# Patient Record
Sex: MALE | Race: WHITE | NOT HISPANIC OR LATINO | Employment: OTHER | URBAN - METROPOLITAN AREA
[De-identification: names, ages, dates, MRNs, and addresses within clinical notes are randomized per-mention and may not be internally consistent; named-entity substitution may affect disease eponyms.]

---

## 2023-03-27 DIAGNOSIS — E21.3 HYPERPARATHYROIDISM: Primary | ICD-10-CM

## 2023-06-08 ENCOUNTER — HOSPITAL ENCOUNTER (OUTPATIENT)
Facility: HOSPITAL | Age: 53
Discharge: HOME | End: 2023-06-08
Attending: INTERNAL MEDICINE | Admitting: INTERNAL MEDICINE
Payer: MEDICARE

## 2023-06-08 VITALS
OXYGEN SATURATION: 100 % | HEART RATE: 79 BPM | HEIGHT: 65 IN | RESPIRATION RATE: 29 BRPM | TEMPERATURE: 98 F | SYSTOLIC BLOOD PRESSURE: 153 MMHG | WEIGHT: 125.69 LBS | DIASTOLIC BLOOD PRESSURE: 81 MMHG | BODY MASS INDEX: 20.94 KG/M2

## 2023-06-08 DIAGNOSIS — I42.8 NON-ISCHEMIC CARDIOMYOPATHY: ICD-10-CM

## 2023-06-08 PROCEDURE — C1751 CATH, INF, PER/CENT/MIDLINE: HCPCS | Performed by: INTERNAL MEDICINE

## 2023-06-08 PROCEDURE — 93460 R&L HRT ART/VENTRICLE ANGIO: CPT | Performed by: INTERNAL MEDICINE

## 2023-06-08 PROCEDURE — 25000003 PHARM REV CODE 250: Performed by: INTERNAL MEDICINE

## 2023-06-08 PROCEDURE — 99152 MOD SED SAME PHYS/QHP 5/>YRS: CPT | Performed by: INTERNAL MEDICINE

## 2023-06-08 PROCEDURE — 99153 MOD SED SAME PHYS/QHP EA: CPT | Performed by: INTERNAL MEDICINE

## 2023-06-08 PROCEDURE — 63600175 PHARM REV CODE 636 W HCPCS: Performed by: INTERNAL MEDICINE

## 2023-06-08 PROCEDURE — C1769 GUIDE WIRE: HCPCS | Performed by: INTERNAL MEDICINE

## 2023-06-08 PROCEDURE — C1894 INTRO/SHEATH, NON-LASER: HCPCS | Performed by: INTERNAL MEDICINE

## 2023-06-08 PROCEDURE — 27201423 OPTIME MED/SURG SUP & DEVICES STERILE SUPPLY: Performed by: INTERNAL MEDICINE

## 2023-06-08 PROCEDURE — 25500020 PHARM REV CODE 255: Performed by: INTERNAL MEDICINE

## 2023-06-08 RX ORDER — LIDOCAINE HYDROCHLORIDE 10 MG/ML
INJECTION, SOLUTION EPIDURAL; INFILTRATION; INTRACAUDAL; PERINEURAL
Status: DISCONTINUED | OUTPATIENT
Start: 2023-06-08 | End: 2023-06-08 | Stop reason: HOSPADM

## 2023-06-08 RX ORDER — SODIUM CHLORIDE 9 MG/ML
INJECTION, SOLUTION INTRAVENOUS ONCE
Status: DISCONTINUED | OUTPATIENT
Start: 2023-06-08 | End: 2023-06-08 | Stop reason: HOSPADM

## 2023-06-08 RX ORDER — DIAZEPAM 5 MG/1
10 TABLET ORAL
Status: DISCONTINUED | OUTPATIENT
Start: 2023-06-08 | End: 2023-06-08 | Stop reason: HOSPADM

## 2023-06-08 RX ORDER — MIDAZOLAM HYDROCHLORIDE 1 MG/ML
INJECTION INTRAMUSCULAR; INTRAVENOUS
Status: DISCONTINUED | OUTPATIENT
Start: 2023-06-08 | End: 2023-06-08 | Stop reason: HOSPADM

## 2023-06-08 RX ORDER — MORPHINE SULFATE 4 MG/ML
2 INJECTION, SOLUTION INTRAMUSCULAR; INTRAVENOUS EVERY 4 HOURS PRN
Status: DISCONTINUED | OUTPATIENT
Start: 2023-06-08 | End: 2023-06-08 | Stop reason: HOSPADM

## 2023-06-08 RX ORDER — FAMOTIDINE 40 MG/1
40 TABLET, FILM COATED ORAL DAILY
COMMUNITY

## 2023-06-08 RX ORDER — AMLODIPINE BESYLATE 10 MG/1
10 TABLET ORAL DAILY
COMMUNITY

## 2023-06-08 RX ORDER — SEVELAMER CARBONATE 800 MG/1
800 TABLET, FILM COATED ORAL
COMMUNITY

## 2023-06-08 RX ORDER — PANTOPRAZOLE SODIUM 20 MG/1
20 TABLET, DELAYED RELEASE ORAL DAILY
COMMUNITY

## 2023-06-08 RX ORDER — HYDRALAZINE HYDROCHLORIDE 20 MG/ML
INJECTION INTRAMUSCULAR; INTRAVENOUS
Status: DISCONTINUED | OUTPATIENT
Start: 2023-06-08 | End: 2023-06-08 | Stop reason: HOSPADM

## 2023-06-08 RX ORDER — FENTANYL CITRATE 50 UG/ML
INJECTION, SOLUTION INTRAMUSCULAR; INTRAVENOUS
Status: DISCONTINUED | OUTPATIENT
Start: 2023-06-08 | End: 2023-06-08 | Stop reason: HOSPADM

## 2023-06-08 RX ORDER — HYDROCODONE BITARTRATE AND ACETAMINOPHEN 5; 325 MG/1; MG/1
1 TABLET ORAL EVERY 4 HOURS PRN
Status: DISCONTINUED | OUTPATIENT
Start: 2023-06-08 | End: 2023-06-08 | Stop reason: HOSPADM

## 2023-06-08 RX ORDER — ACETAMINOPHEN 325 MG/1
650 TABLET ORAL EVERY 4 HOURS PRN
Status: DISCONTINUED | OUTPATIENT
Start: 2023-06-08 | End: 2023-06-08 | Stop reason: HOSPADM

## 2023-06-08 RX ORDER — ASPIRIN 81 MG/1
81 TABLET ORAL DAILY
COMMUNITY

## 2023-06-08 RX ORDER — SODIUM CHLORIDE 9 MG/ML
INJECTION, SOLUTION INTRAVENOUS CONTINUOUS
Status: DISCONTINUED | OUTPATIENT
Start: 2023-06-08 | End: 2023-06-08 | Stop reason: HOSPADM

## 2023-06-08 RX ORDER — HYDRALAZINE HYDROCHLORIDE 20 MG/ML
10 INJECTION INTRAMUSCULAR; INTRAVENOUS EVERY 4 HOURS PRN
Status: DISCONTINUED | OUTPATIENT
Start: 2023-06-08 | End: 2023-06-08 | Stop reason: HOSPADM

## 2023-06-08 RX ORDER — VITAMIN B COMPLEX-VITAMIN C-FOLIC ACID 0.8 MG TABLET
1 TABLET DAILY
COMMUNITY

## 2023-06-08 RX ORDER — ONDANSETRON 4 MG/1
8 TABLET, ORALLY DISINTEGRATING ORAL EVERY 8 HOURS PRN
Status: DISCONTINUED | OUTPATIENT
Start: 2023-06-08 | End: 2023-06-08 | Stop reason: HOSPADM

## 2023-06-08 RX ORDER — HYDRALAZINE HYDROCHLORIDE 25 MG/1
25 TABLET, FILM COATED ORAL 2 TIMES DAILY
COMMUNITY
End: 2023-11-02 | Stop reason: SDUPTHER

## 2023-06-08 RX ORDER — DIPHENHYDRAMINE HCL 25 MG
50 CAPSULE ORAL
Status: DISCONTINUED | OUTPATIENT
Start: 2023-06-08 | End: 2023-06-08 | Stop reason: HOSPADM

## 2023-06-08 RX ORDER — CARVEDILOL 25 MG/1
25 TABLET ORAL 2 TIMES DAILY WITH MEALS
COMMUNITY

## 2023-06-08 RX ORDER — SODIUM CHLORIDE 0.9 % (FLUSH) 0.9 %
10 SYRINGE (ML) INJECTION
Status: DISCONTINUED | OUTPATIENT
Start: 2023-06-08 | End: 2023-06-08 | Stop reason: HOSPADM

## 2023-06-08 RX ADMIN — DIAZEPAM 10 MG: 5 TABLET ORAL at 11:06

## 2023-06-08 RX ADMIN — DIPHENHYDRAMINE HYDROCHLORIDE 50 MG: 25 CAPSULE ORAL at 11:06

## 2023-06-08 NOTE — Clinical Note
The site was marked. The groin was prepped. The site was prepped with ChloraPrep. The site was clipped. The patient was draped.

## 2023-06-08 NOTE — Clinical Note
The PA catheter was inserted into the and was removed from the pulmonary wedge. Hemodynamics were performed. Catheter repositioned into PA, RV, RA.

## 2023-06-08 NOTE — Clinical Note
The PA catheter was inserted into the and was removed from the right atrium. Hemodynamics were performed.

## 2023-06-08 NOTE — Clinical Note
The catheter was inserted into the ostium   right coronary artery. An angiography was performed of the right coronary arteries. Multiple views were taken. The angiography was performed via power injection.   4

## 2023-06-08 NOTE — DISCHARGE INSTRUCTIONS
-Remove dressing in 24hrs  -No driving for two Days  -Do not lift anything heavier than a gallon of milk for 5 days  -No tub bathing for 5 days (if you have a groin site).   -No lotions, powders, creams around site for 5 days  - Return to the nearest emergency room if you start running a fever; have any kind of discharge coming from the site, the site looks red or swollen.  - If site starts to bleed, lay flat on the ground, apply pressure to the site and call 911.

## 2023-06-08 NOTE — Clinical Note
The catheter was inserted into the ostium   left main. An angiography was performed of the left coronary arteries. Multiple views were taken. The angiography was performed via power injection.  JL 4

## 2023-06-08 NOTE — Clinical Note
The defib pads were placed on the patient's chest. [Follow-up Visit ___] : a follow-up visit  for [unfilled]

## 2023-06-27 NOTE — DISCHARGE SUMMARY
[facility] - Cath Lab Services  Discharge Note  Short Stay    Procedure(s) (LRB):  CATHETERIZATION, HEART, BOTH LEFT AND RIGHT (N/A)      OUTCOME: Patient tolerated treatment/procedure well without complication and is now ready for discharge.    DISPOSITION: Home or Self Care    FINAL DIAGNOSIS:  AS, CAD    FOLLOWUP: In clinic    DISCHARGE INSTRUCTIONS: No squatting, or heavy lifting for 1 week      Clinical Reference Documents Added to Patient Instructions         Document    CARDIAC CATHETERIZATION (ENGLISH)            TIME SPENT ON DISCHARGE: 31 minutes

## 2023-10-03 ENCOUNTER — TELEPHONE (OUTPATIENT)
Dept: TRANSPLANT | Facility: CLINIC | Age: 53
End: 2023-10-03
Payer: MEDICARE

## 2023-10-03 DIAGNOSIS — I50.9 CONGESTIVE HEART FAILURE, UNSPECIFIED HF CHRONICITY, UNSPECIFIED HEART FAILURE TYPE: Primary | ICD-10-CM

## 2023-11-02 ENCOUNTER — OFFICE VISIT (OUTPATIENT)
Dept: TRANSPLANT | Facility: CLINIC | Age: 53
End: 2023-11-02
Payer: MEDICARE

## 2023-11-02 ENCOUNTER — LAB VISIT (OUTPATIENT)
Dept: LAB | Facility: HOSPITAL | Age: 53
End: 2023-11-02
Attending: INTERNAL MEDICINE
Payer: MEDICARE

## 2023-11-02 VITALS
BODY MASS INDEX: 21.01 KG/M2 | HEIGHT: 65 IN | SYSTOLIC BLOOD PRESSURE: 166 MMHG | WEIGHT: 126.13 LBS | DIASTOLIC BLOOD PRESSURE: 88 MMHG | HEART RATE: 82 BPM

## 2023-11-02 DIAGNOSIS — I50.43 ACUTE ON CHRONIC COMBINED SYSTOLIC AND DIASTOLIC HEART FAILURE: ICD-10-CM

## 2023-11-02 DIAGNOSIS — I34.0 MITRAL VALVE INSUFFICIENCY, UNSPECIFIED ETIOLOGY: ICD-10-CM

## 2023-11-02 DIAGNOSIS — I42.8 NON-ISCHEMIC CARDIOMYOPATHY: ICD-10-CM

## 2023-11-02 DIAGNOSIS — Z74.09 IMPAIRED MOBILITY: ICD-10-CM

## 2023-11-02 DIAGNOSIS — I50.9 CONGESTIVE HEART FAILURE, UNSPECIFIED HF CHRONICITY, UNSPECIFIED HEART FAILURE TYPE: ICD-10-CM

## 2023-11-02 DIAGNOSIS — N25.81 SECONDARY HYPERPARATHYROIDISM: ICD-10-CM

## 2023-11-02 DIAGNOSIS — N18.6 ESRD (END STAGE RENAL DISEASE): ICD-10-CM

## 2023-11-02 DIAGNOSIS — I50.9 CONGESTIVE HEART FAILURE, UNSPECIFIED HF CHRONICITY, UNSPECIFIED HEART FAILURE TYPE: Primary | ICD-10-CM

## 2023-11-02 DIAGNOSIS — Z99.2 HEMODIALYSIS STATUS: ICD-10-CM

## 2023-11-02 DIAGNOSIS — I15.8 OTHER SECONDARY HYPERTENSION: ICD-10-CM

## 2023-11-02 DIAGNOSIS — I07.1 TRICUSPID VALVE INSUFFICIENCY, UNSPECIFIED ETIOLOGY: ICD-10-CM

## 2023-11-02 PROBLEM — I10 HYPERTENSION: Status: ACTIVE | Noted: 2023-11-02

## 2023-11-02 LAB
ALBUMIN SERPL BCP-MCNC: 4.3 G/DL (ref 3.5–5.2)
ALP SERPL-CCNC: 144 U/L (ref 55–135)
ALT SERPL W/O P-5'-P-CCNC: 11 U/L (ref 10–44)
ANION GAP SERPL CALC-SCNC: 14 MMOL/L (ref 8–16)
AST SERPL-CCNC: 22 U/L (ref 10–40)
BILIRUB SERPL-MCNC: 0.6 MG/DL (ref 0.1–1)
BNP SERPL-MCNC: >4900 PG/ML (ref 0–99)
BUN SERPL-MCNC: 15 MG/DL (ref 6–20)
CALCIUM SERPL-MCNC: 9 MG/DL (ref 8.7–10.5)
CHLORIDE SERPL-SCNC: 100 MMOL/L (ref 95–110)
CO2 SERPL-SCNC: 21 MMOL/L (ref 23–29)
CREAT SERPL-MCNC: 4.4 MG/DL (ref 0.5–1.4)
EST. GFR  (NO RACE VARIABLE): 15.3 ML/MIN/1.73 M^2
GLUCOSE SERPL-MCNC: 89 MG/DL (ref 70–110)
MAGNESIUM SERPL-MCNC: 2 MG/DL (ref 1.6–2.6)
POTASSIUM SERPL-SCNC: 3.8 MMOL/L (ref 3.5–5.1)
PROT SERPL-MCNC: 8.4 G/DL (ref 6–8.4)
SODIUM SERPL-SCNC: 135 MMOL/L (ref 136–145)
T4 FREE SERPL-MCNC: 1.03 NG/DL (ref 0.71–1.51)
TSH SERPL DL<=0.005 MIU/L-ACNC: 4.23 UIU/ML (ref 0.4–4)

## 2023-11-02 PROCEDURE — 99204 PR OFFICE/OUTPT VISIT, NEW, LEVL IV, 45-59 MIN: ICD-10-PCS | Mod: S$GLB,,, | Performed by: INTERNAL MEDICINE

## 2023-11-02 PROCEDURE — 3077F PR MOST RECENT SYSTOLIC BLOOD PRESSURE >= 140 MM HG: ICD-10-PCS | Mod: CPTII,S$GLB,, | Performed by: INTERNAL MEDICINE

## 2023-11-02 PROCEDURE — 80053 COMPREHEN METABOLIC PANEL: CPT | Performed by: INTERNAL MEDICINE

## 2023-11-02 PROCEDURE — 99999 PR PBB SHADOW E&M-EST. PATIENT-LVL III: ICD-10-PCS | Mod: PBBFAC,,, | Performed by: INTERNAL MEDICINE

## 2023-11-02 PROCEDURE — 99999 PR PBB SHADOW E&M-EST. PATIENT-LVL III: CPT | Mod: PBBFAC,,, | Performed by: INTERNAL MEDICINE

## 2023-11-02 PROCEDURE — 84439 ASSAY OF FREE THYROXINE: CPT | Performed by: INTERNAL MEDICINE

## 2023-11-02 PROCEDURE — 83880 ASSAY OF NATRIURETIC PEPTIDE: CPT | Performed by: INTERNAL MEDICINE

## 2023-11-02 PROCEDURE — 83880 ASSAY OF NATRIURETIC PEPTIDE: CPT | Mod: 91 | Performed by: INTERNAL MEDICINE

## 2023-11-02 PROCEDURE — 1159F PR MEDICATION LIST DOCUMENTED IN MEDICAL RECORD: ICD-10-PCS | Mod: CPTII,S$GLB,, | Performed by: INTERNAL MEDICINE

## 2023-11-02 PROCEDURE — 84443 ASSAY THYROID STIM HORMONE: CPT | Performed by: INTERNAL MEDICINE

## 2023-11-02 PROCEDURE — 3008F PR BODY MASS INDEX (BMI) DOCUMENTED: ICD-10-PCS | Mod: CPTII,S$GLB,, | Performed by: INTERNAL MEDICINE

## 2023-11-02 PROCEDURE — 3079F PR MOST RECENT DIASTOLIC BLOOD PRESSURE 80-89 MM HG: ICD-10-PCS | Mod: CPTII,S$GLB,, | Performed by: INTERNAL MEDICINE

## 2023-11-02 PROCEDURE — 1159F MED LIST DOCD IN RCRD: CPT | Mod: CPTII,S$GLB,, | Performed by: INTERNAL MEDICINE

## 2023-11-02 PROCEDURE — 83735 ASSAY OF MAGNESIUM: CPT | Performed by: INTERNAL MEDICINE

## 2023-11-02 PROCEDURE — 3079F DIAST BP 80-89 MM HG: CPT | Mod: CPTII,S$GLB,, | Performed by: INTERNAL MEDICINE

## 2023-11-02 PROCEDURE — 3077F SYST BP >= 140 MM HG: CPT | Mod: CPTII,S$GLB,, | Performed by: INTERNAL MEDICINE

## 2023-11-02 PROCEDURE — 99204 OFFICE O/P NEW MOD 45 MIN: CPT | Mod: S$GLB,,, | Performed by: INTERNAL MEDICINE

## 2023-11-02 PROCEDURE — 3008F BODY MASS INDEX DOCD: CPT | Mod: CPTII,S$GLB,, | Performed by: INTERNAL MEDICINE

## 2023-11-02 RX ORDER — FOLIC ACID/VIT B COMPLEX AND C 0.8 MG
1 TABLET ORAL DAILY
COMMUNITY
Start: 2023-10-12

## 2023-11-02 RX ORDER — ISOSORBIDE DINITRATE 20 MG/1
20 TABLET ORAL 3 TIMES DAILY
Qty: 90 TABLET | Refills: 11 | Status: SHIPPED | OUTPATIENT
Start: 2023-11-02 | End: 2024-11-01

## 2023-11-02 RX ORDER — HYDRALAZINE HYDROCHLORIDE 25 MG/1
50 TABLET, FILM COATED ORAL 3 TIMES DAILY
Qty: 90 TABLET | Refills: 5 | Status: SHIPPED | OUTPATIENT
Start: 2023-11-02 | End: 2024-03-01 | Stop reason: SDUPTHER

## 2023-11-02 NOTE — PATIENT INSTRUCTIONS
You have too much extra fluid on you.  Please adhere to a low sodium diet (no more than 1.5 grams of sodium in 24h).  3.   Follow fluid restriction of  2. no more than 1.5 liters in 24 hours..   4. Please have your dialysis team to aggressively remove the extra fluid you have.  5. Increase HYDRALAZINE from 25 mg to 50 mg three times a day (every 8 hours).  6. Start isosorbide dinitrate 20 mg three time a day.  7. F/u in 2 months with echo and labs,

## 2023-11-02 NOTE — PROGRESS NOTES
Advanced Heart Failure [facility] Transplantation Clinic Follow up.        Attending Physician: Uir Stone MD.  The patient's last visit with me was on Visit date not found.         HPI.  This is a 54 yo man with a history of HTN, ESRD on HD, CVA, cardiomyopathy/HF, LVEF 35%, suspected non ischemic, based on result of outside hospital cardiac PET stress that was negative for perfusion defects or ischemia.  His LVEF has reportedly decreased from 45% to 35%. Echo also reported severe TR and moderate MR. He also underwent RHC and LHC at [facility] last June 2023. Non obstructive CAD.    PMH  -ESRD since he was in his 20s. He was told his kidneys never developed completely.  -HTN  -new diagnosis of HF this year. EF 45% then down to 35%.  -severe secondary hyperparathyroidism with significant bones deformity. Use a wheelchair to get around.    SH  He has a wife and a son. Referred from [facility] in [place],     Review of Systems   Constitutional:  Positive for activity change, appetite change and fatigue. Negative for chills, diaphoresis and fever.   HENT:  Negative for nasal congestion, rhinorrhea and sore throat.    Eyes:  Negative for visual disturbance.   Respiratory:  Positive for shortness of breath. Negative for chest tightness and stridor.    Cardiovascular:  Positive for leg swelling. Negative for chest pain and palpitations.   Gastrointestinal:  Positive for abdominal distention. Negative for abdominal pain, diarrhea, nausea and vomiting.   Genitourinary:  Negative for difficulty urinating, dysuria and hematuria.   Integumentary:  Negative for rash.   Neurological:  Negative for seizures, syncope and light-headedness.   Psychiatric/Behavioral:  Negative for agitation and hallucinations.         Past Medical History:   Diagnosis Date    CVD (cardiovascular disease)     ESRD (end stage renal disease)     Hypertension     Venous  "insufficiency         Past Surgical History:   Procedure Laterality Date    CATHETERIZATION OF BOTH LEFT AND RIGHT HEART N/A 6/8/2023    Procedure: CATHETERIZATION, HEART, BOTH LEFT AND RIGHT;  Surgeon: Yaakov Michelle MD;  Location: [facility];  Service: Cardiology;  Laterality: N/A;  RLHC +/- INT. VIA GROIN ACCESS *8am arrival*    DIALYSIS FISTULA CREATION      ELBOW SURGERY      KNEE SURGERY          History reviewed. No pertinent family history.     Review of patient's allergies indicates:   Allergen Reactions    Doxercalciferol Itching        Current Outpatient Medications   Medication Instructions    amLODIPine (NORVASC) 10 mg, Oral, Daily    aspirin (ECOTRIN) 81 mg, Oral, Daily    B complex-vitamin C-folic acid (RENAL VITAMIN) 0.8 mg Tab 1 tablet, Oral, Daily    carvediloL (COREG) 25 mg, Oral, 2 times daily with meals    famotidine (PEPCID) 40 mg, Oral, Daily    hydrALAZINE (APRESOLINE) 25 mg, Oral, 2 times daily    pantoprazole (PROTONIX) 20 mg, Oral, Daily    sevelamer carbonate (RENVELA) 800 mg, Oral, 4 times daily with meals & nightly        There were no vitals filed for this visit.     Wt Readings from Last 3 Encounters:   06/08/23 57 kg (125 lb 10.6 oz)   01/15/23 56.6 kg (124 lb 12.5 oz)     Temp Readings from Last 3 Encounters:   06/08/23 97.7 °F (36.5 °C) (Oral)   01/15/23 99.5 °F (37.5 °C)     BP Readings from Last 3 Encounters:   06/08/23 (!) 153/81   01/15/23 (!) 199/107     Pulse Readings from Last 3 Encounters:   06/08/23 79   01/15/23 82        There is no height or weight on file to calculate BMI. Estimated body surface area is 1.62 meters squared as calculated from the following:    Height as of 6/8/23: 5' 5" (1.651 m).    Weight as of 6/8/23: 57 kg (125 lb 10.6 oz).     Physical Exam  Constitutional:       Appearance: He is well-developed.   HENT:      Head: Normocephalic and atraumatic.      Right Ear: External ear normal.      Left Ear: External ear normal.   Eyes:      " Conjunctiva/sclera: Conjunctivae normal.      Pupils: Pupils are equal, round, and reactive to light.   Neck:      Vascular: Hepatojugular reflux and JVD present.      Comments: JVP 18 cmH20  Cardiovascular:      Rate and Rhythm: Normal rate and regular rhythm.      Pulses: Intact distal pulses.      Heart sounds: S1 normal and S2 normal. No murmur heard.     No friction rub. No gallop.   Pulmonary:      Effort: Pulmonary effort is normal.      Breath sounds: Examination of the right-lower field reveals rales. Examination of the left-lower field reveals rales. Rales present.   Abdominal:      General: Bowel sounds are normal. There is no distension.      Palpations: Abdomen is soft.      Tenderness: There is no abdominal tenderness. There is no guarding or rebound.   Musculoskeletal:      Cervical back: Normal range of motion and neck supple.      Right lower leg: 3+ Edema present.      Left lower leg: 3+ Edema present.   Neurological:      Mental Status: He is alert and oriented to person, place, and time.          Lab Results   Component Value Date     (L) 01/20/2023    K 4.0 01/20/2023     01/20/2023    CO2 19 (L) 01/20/2023    BUN 24 01/20/2023    CREATININE 5.39 (H) 01/20/2023    ALBUMIN 3.2 (L) 01/20/2023       Results for orders placed during the hospital encounter of 06/08/23    Cardiac catheterization    Conclusion    The left ventricular end diastolic pressure was normal.    The estimated blood loss was <50 mL.    There was non-obstructive coronary artery disease..    Pulmonary hypertension was moderate.    Prior to arrival consent was signed and witnessed appropriately.  For to catheterization lab in a fasting state placed on table and prepped and draped in usual sterile fashion.  Time-out was completed.  Right groin was anesthetized with 1% lidocaine.  Via the Seldinger technique and ultrasound guidance a 5 Somali sheath was placed over wire into the right common femoral artery and a 7 Somali  sheath into the right common femoral vein.  Using the vein access point a right heart catheterization was performed using [place]-Roly catheter.  And JL4 and a 0.025 wire then used to traverse the catheter up into the right PA.  Exchanged for a 0.018 wire and then a [place]eld[place]-Roly catheter was placed over this.  Hemodynamics were measured.  No complications were noted.  Removed successfully.  Next,.  JL4 was engaged into the left main.  Images were taken in multiple views.  Then exchanged for a JR4, which was then engaged into the RCA.  Images were taken in multiple views.  JR4 was then engaged for a pigtail catheter was then cross into the left ventricular cavity.  Hemodynamics were measured.  A gradient was measured on pullback.  All wires and catheters removed.   He was transported to the postop area in a stable condition    Findings:  Left main:  Large caliber left main bifurcates into LAD and circumflex.  No significant disease noted.  LAD:  Moderate large caliber LAD wraps the apex in type 1 fashion.  Large diagonal branches noted.  No significant disease noted.  Circumflex:  Small caliber vessel with no significant disease.  Tortuosity noted.  OM1 is noted with significant tortuosity distally.  true circumflex is trivial in caliber.  RCA:  Large caliber superdominant vessel which arborizes out.  Luminal irregularities noted.  Ramus intermedius:  Small caliber vessel covering the lateral wall.  No disease noted.    LVEDP:  Within normal limits  Aortic valve:  No significant stenosis  Cardiac output-7.2 liters/minute, cardiac index 4.44  PA sat:  70%  PVR 4.29.  Moderate-Severe pulmonary hypertension with pulmonary artery pressure 65/24.  Pulmonary capillary wedge pressure mean: 10         Assessment and Plan:  Congestive heart failure, unspecified HF chronicity, unspecified heart failure type  -     Echo; Future; Expected date: 01/02/2024  -     CBC Auto Differential; Future; Expected date: 12/02/2023  -      Comprehensive Metabolic Panel; Future; Expected date: 12/02/2023  -     NT-Pro Natriuretic Peptide; Future; Expected date: 12/02/2023  -     Echo; Future; Expected date: 12/02/2023    Non-ischemic cardiomyopathy    Acute on chronic combined systolic and diastolic heart failure    Mitral valve insufficiency, unspecified etiology    Tricuspid valve insufficiency, unspecified etiology    ESRD (end stage renal disease)    Hemodialysis status    Secondary hyperparathyroidism    Other secondary hypertension    Impaired mobility    Other orders  -     hydrALAZINE (APRESOLINE) 25 MG tablet; Take 2 tablets (50 mg total) by mouth 3 (three) times daily.  Dispense: 90 tablet; Refill: 5  -     isosorbide dinitrate (ISORDIL) 20 MG tablet; Take 1 tablet (20 mg total) by mouth 3 (three) times daily.  Dispense: 90 tablet; Refill: 11         Acute on chronic systolic HF, NYHA class III, stage C.  Etiology: NICM, negative LHC and PET stress test.  Devices: none.  Medications: carvedilol 25 mg BID, hydralazine 25 mg TID.  Hemodynamic status: warm, hypertensive, severely hypervolemic (see physical exam).  Plan:  -Patient does not make urine and is dependent on HD for management of fluid status. He needs aggressive decongestion by his HD team. This recommendation was conveyed to the patient. He understood the importance.   -Increase hydralazine from 25 mg to 50 mg three times a day.  -Start isosorbide dinitrate 20 mg three time a day.    F/u in 2 months with echo and labs.    2. Severe TR and MR??  May be functional. We will obtain our own in house echo for assessment, hopefully after thorough decongestion by his HD team.    3. ESRD on HD for > 20 years.    4.  Bone deformities and decreased mobility. Sequelae of ESRD and secondary hyperparathyroidism.Had parathyroid surgery recently at [facility]SH.

## 2023-11-03 LAB — NT-PROBNP SERPL IA-MCNC: ABNORMAL PG/ML

## 2024-03-01 RX ORDER — HYDRALAZINE HYDROCHLORIDE 25 MG/1
50 TABLET, FILM COATED ORAL 3 TIMES DAILY
Qty: 90 TABLET | Refills: 5 | Status: SHIPPED | OUTPATIENT
Start: 2024-03-01

## 2024-03-21 ENCOUNTER — CLINICAL SUPPORT (OUTPATIENT)
Dept: AUDIOLOGY | Facility: HOSPITAL | Age: 54
End: 2024-03-21
Payer: MEDICARE

## 2024-03-21 DIAGNOSIS — H90.3 SENSORINEURAL HEARING LOSS, BILATERAL: Primary | ICD-10-CM

## 2024-03-21 PROCEDURE — 92567 TYMPANOMETRY: CPT | Performed by: AUDIOLOGIST

## 2024-03-21 PROCEDURE — 92557 COMPREHENSIVE HEARING TEST: CPT | Performed by: AUDIOLOGIST

## 2024-03-21 NOTE — PROGRESS NOTES
Hearing Evaluation        Patient History: Mr. Boyce reports a gradual decrease in hearing, onset years ago.  He also reports constant bilateral tinnitus. Vertigo and middle ear issues are denied. All additional history is unremarkable.        Test Results:                    Pure Tone Testing:      Right ear:       Mild to profound sensorineural hearing loss from 2k-8kHz. Speech reception threshold is in agreement with puretone findings.  Discrimination score of 68% is considered fair.        Left ear:          Mild to profound sensorineural hearing loss from 2k-8kHz. Speech reception threshold is in agreement with puretone findings.  Discrimination score of 70% is considered fair.                                                                            Tympanometry:                                           Right ear:       Type 'A' tymp, normal middle ear pressure/function     Left ear:          Type 'A' tymp, normal middle ear pressure/function                                           Interpretations:      Behavioral test findings indicate a mild to profound precipitously sloping hearing loss from 2k-8kHz, bilaterally. Speech reception thresholds obtained at 30dB, AD and 20dB, AS, and are in agreement with puretone findings. Speech discrimination scores of 68%, AD and 70%, AS, are considered fair.  Immittance measures indicate normal middle ear pressure/function, bilaterally.            Recommendations:   1. Annual hearing evaluations  2. Binaural amplification (Information on [place] given)

## 2024-04-16 ENCOUNTER — HOSPITAL ENCOUNTER (OUTPATIENT)
Facility: HOSPITAL | Age: 54
Discharge: HOME | End: 2024-04-16
Attending: INTERNAL MEDICINE | Admitting: INTERNAL MEDICINE
Payer: MEDICARE

## 2024-04-16 ENCOUNTER — ANESTHESIA (OUTPATIENT)
Dept: ENDOSCOPY | Facility: HOSPITAL | Age: 54
End: 2024-04-16
Payer: MEDICARE

## 2024-04-16 ENCOUNTER — ANESTHESIA EVENT (OUTPATIENT)
Dept: ENDOSCOPY | Facility: HOSPITAL | Age: 54
End: 2024-04-16
Payer: MEDICARE

## 2024-04-16 DIAGNOSIS — R14.2 BELCHING: ICD-10-CM

## 2024-04-16 DIAGNOSIS — R68.81 EARLY SATIETY: ICD-10-CM

## 2024-04-16 DIAGNOSIS — K14.6 BURNING TONGUE: ICD-10-CM

## 2024-04-16 DIAGNOSIS — R09.89 PULMONARY CONGESTION: ICD-10-CM

## 2024-04-16 PROCEDURE — 25000003 PHARM REV CODE 250: Performed by: STUDENT IN AN ORGANIZED HEALTH CARE EDUCATION/TRAINING PROGRAM

## 2024-04-16 PROCEDURE — 91035 G-ESOPH REFLX TST W/ELECTROD: CPT | Mod: TC | Performed by: INTERNAL MEDICINE

## 2024-04-16 PROCEDURE — 88313 SPECIAL STAINS GROUP 2: CPT

## 2024-04-16 PROCEDURE — 88312 SPECIAL STAINS GROUP 1: CPT

## 2024-04-16 PROCEDURE — 37000008 HC ANESTHESIA 1ST 15 MINUTES: Performed by: INTERNAL MEDICINE

## 2024-04-16 PROCEDURE — 88305 TISSUE EXAM BY PATHOLOGIST: CPT | Performed by: INTERNAL MEDICINE

## 2024-04-16 PROCEDURE — 63600175 PHARM REV CODE 636 W HCPCS: Performed by: STUDENT IN AN ORGANIZED HEALTH CARE EDUCATION/TRAINING PROGRAM

## 2024-04-16 PROCEDURE — 27201423 OPTIME MED/SURG SUP & DEVICES STERILE SUPPLY: Performed by: INTERNAL MEDICINE

## 2024-04-16 PROCEDURE — D9220A PRA ANESTHESIA: Mod: CRNA,,, | Performed by: STUDENT IN AN ORGANIZED HEALTH CARE EDUCATION/TRAINING PROGRAM

## 2024-04-16 PROCEDURE — 43239 EGD BIOPSY SINGLE/MULTIPLE: CPT | Performed by: INTERNAL MEDICINE

## 2024-04-16 PROCEDURE — 37000009 HC ANESTHESIA EA ADD 15 MINS: Performed by: INTERNAL MEDICINE

## 2024-04-16 PROCEDURE — D9220A PRA ANESTHESIA: Mod: ANES,,, | Performed by: ANESTHESIOLOGY

## 2024-04-16 RX ORDER — SODIUM CHLORIDE, SODIUM GLUCONATE, SODIUM ACETATE, POTASSIUM CHLORIDE AND MAGNESIUM CHLORIDE 30; 37; 368; 526; 502 MG/100ML; MG/100ML; MG/100ML; MG/100ML; MG/100ML
INJECTION, SOLUTION INTRAVENOUS ONCE
Status: DISCONTINUED | OUTPATIENT
Start: 2024-04-16 | End: 2024-04-16 | Stop reason: SDUPTHER

## 2024-04-16 RX ORDER — PROPOFOL 10 MG/ML
VIAL (ML) INTRAVENOUS
Status: DISCONTINUED | OUTPATIENT
Start: 2024-04-16 | End: 2024-04-16

## 2024-04-16 RX ORDER — LIDOCAINE HYDROCHLORIDE 20 MG/ML
INJECTION INTRAVENOUS
Status: DISCONTINUED | OUTPATIENT
Start: 2024-04-16 | End: 2024-04-16

## 2024-04-16 RX ORDER — LIDOCAINE HYDROCHLORIDE 10 MG/ML
INJECTION, SOLUTION EPIDURAL; INFILTRATION; INTRACAUDAL; PERINEURAL
Status: DISCONTINUED
Start: 2024-04-16 | End: 2024-04-16 | Stop reason: HOSPADM

## 2024-04-16 RX ORDER — PROPOFOL 10 MG/ML
VIAL (ML) INTRAVENOUS
Status: COMPLETED
Start: 2024-04-16 | End: 2024-04-16

## 2024-04-16 RX ADMIN — SODIUM CHLORIDE, SODIUM GLUCONATE, SODIUM ACETATE, POTASSIUM CHLORIDE AND MAGNESIUM CHLORIDE: 526; 502; 368; 37; 30 INJECTION, SOLUTION INTRAVENOUS at 08:04

## 2024-04-16 RX ADMIN — PROPOFOL 30 MG: 10 INJECTION, EMULSION INTRAVENOUS at 09:04

## 2024-04-16 RX ADMIN — PROPOFOL 70 MG: 10 INJECTION, EMULSION INTRAVENOUS at 08:04

## 2024-04-16 RX ADMIN — LIDOCAINE HYDROCHLORIDE 50 MG: 20 INJECTION INTRAVENOUS at 08:04

## 2024-04-16 NOTE — ANESTHESIA PREPROCEDURE EVALUATION
04/16/2024  Paul Boyce is a 54 y.o., male.  Pre-operative evaluation for Procedure(s) (LRB):  BRAVO 96 HR W/ DILATION (N/A)  BRAVO 96 HR (N/A)    Paul Boyce is a 54 y.o. male     Patient Active Problem List   Diagnosis    Non-ischemic cardiomyopathy    Acute on chronic combined systolic and diastolic heart failure    MR (mitral regurgitation)    Tricuspid regurgitation    ESRD (end stage renal disease)    Hemodialysis status    Secondary hyperparathyroidism    Hypertension    Impaired mobility       Review of patient's allergies indicates:   Allergen Reactions    Doxercalciferol Itching       No current facility-administered medications on file prior to encounter.     Current Outpatient Medications on File Prior to Encounter   Medication Sig Dispense Refill    amLODIPine (NORVASC) 10 MG tablet Take 10 mg by mouth once daily.      aspirin (ECOTRIN) 81 MG EC tablet Take 81 mg by mouth once daily.      B complex-vitamin C-folic acid (BERNARDA-SHERRELL) 0.8 mg Tab Take 1 tablet by mouth Daily.      B complex-vitamin C-folic acid (RENAL VITAMIN) 0.8 mg Tab Take 1 tablet by mouth once daily.      carvediloL (COREG) 25 MG tablet Take 25 mg by mouth 2 (two) times daily with meals.      dextrose 5 % in water (D5W) PgBk 50 mL with calcium gluconate 100 mg/mL (10%) Soln 1 g Inject 1,000 mLs into the vein.      famotidine (PEPCID) 40 MG tablet Take 40 mg by mouth once daily.      hydrALAZINE (APRESOLINE) 25 MG tablet Take 2 tablets (50 mg total) by mouth 3 (three) times daily. 90 tablet 5    isosorbide dinitrate (ISORDIL) 20 MG tablet Take 1 tablet (20 mg total) by mouth 3 (three) times daily. 90 tablet 11    pantoprazole (PROTONIX) 20 MG tablet Take 20 mg by mouth once daily.      sevelamer carbonate (RENVELA) 800 mg Tab Take 800 mg by mouth 4 (four) times daily with meals and nightly.         Past Surgical  "History:   Procedure Laterality Date    CATHETERIZATION OF BOTH LEFT AND RIGHT HEART N/A 6/8/2023    Procedure: CATHETERIZATION, HEART, BOTH LEFT AND RIGHT;  Surgeon: Yaakov Michelle MD;  Location: [facility] CATH LAB;  Service: Cardiology;  Laterality: N/A;  RLHC +/- INT. VIA GROIN ACCESS *8am arrival*    DIALYSIS FISTULA CREATION      ELBOW SURGERY      KNEE SURGERY         CBC: No results for input(s): "WBC", "RBC", "HGB", "HCT", "PLT", "MCV", "MCH", "MCHC" in the last 72 hours.    CMP: No results for input(s): "NA", "K", "CL", "CO2", "BUN", "CREATININE", "GLU", "MG", "PHOS", "CALCIUM", "ALBUMIN", "PROT", "ALKPHOS", "ALT", "AST", "BILITOT" in the last 72 hours.    INR  No results for input(s): "PT", "INR", "PROTIME", "APTT" in the last 72 hours.    Last 3 sets of Vitals        6/8/2023     8:56 AM 6/8/2023     1:32 PM 11/2/2023     1:30 PM   Vitals - 1 value per visit   SYSTOLIC   166   DIASTOLIC   88   Pulse   82   Resp  23    Weight (lb) 125.66  126.1   Weight (kg) 57  57.2   Height 5' 5" (1.651 m)  5' 5" (1.651 m)   BMI (Calculated) 20.9  21   Pain Score   Eight         BMP  Lab Results   Component Value Date     (L) 11/02/2023    K 3.8 11/02/2023     11/02/2023    CO2 21 (L) 11/02/2023    BUN 15 11/02/2023    CREATININE 4.4 (H) 11/02/2023    CALCIUM 9.0 11/02/2023    ANIONGAP 14 11/02/2023    ESTGFRAFRICA 12 01/20/2023          Results for orders placed during the hospital encounter of 06/08/23    Cardiac catheterization    Conclusion    The left ventricular end diastolic pressure was normal.    The estimated blood loss was <50 mL.    There was non-obstructive coronary artery disease..    Pulmonary hypertension was moderate.    Prior to arrival consent was signed and witnessed appropriately.  For to catheterization lab in a fasting state placed on table and prepped and draped in usual sterile fashion.  Time-out was completed.  Right groin was anesthetized with 1% lidocaine.  Via the Seldinger technique and " ultrasound guidance a 5 Uzbek sheath was placed over wire into the right common femoral artery and a 7 Uzbek sheath into the right common femoral vein.  Using the vein access point a right heart catheterization was performed using [place]-Roly catheter.  And JL4 and a 0.025 wire then used to traverse the catheter up into the right PA.  Exchanged for a 0.018 wire and then a [place]-Roly catheter was placed over this.  Hemodynamics were measured.  No complications were noted.  Removed successfully.  Next,.  JL4 was engaged into the left main.  Images were taken in multiple views.  Then exchanged for a JR4, which was then engaged into the RCA.  Images were taken in multiple views.  JR4 was then engaged for a pigtail catheter was then cross into the left ventricular cavity.  Hemodynamics were measured.  A gradient was measured on pullback.  All wires and catheters removed.   He was transported to the postop area in a stable condition    Findings:  Left main:  Large caliber left main bifurcates into LAD and circumflex.  No significant disease noted.  LAD:  Moderate large caliber LAD wraps the apex in type 1 fashion.  Large diagonal branches noted.  No significant disease noted.  Circumflex:  Small caliber vessel with no significant disease.  Tortuosity noted.  OM1 is noted with significant tortuosity distally.  true circumflex is trivial in caliber.  RCA:  Large caliber superdominant vessel which arborizes out.  Luminal irregularities noted.  Ramus intermedius:  Small caliber vessel covering the lateral wall.  No disease noted.    LVEDP:  Within normal limits  Aortic valve:  No significant stenosis  Cardiac output-7.2 liters/minute, cardiac index 4.44  PA sat:  70%  PVR 4.29.  Moderate-Severe pulmonary hypertension with pulmonary artery pressure 65/24.  Pulmonary capillary wedge pressure mean: 10     Diagnostic Studies:  CXR :    EKG :      2D Echo :  No results found for this or any previous visit.    Pre-op Assessment    I  have reviewed the Patient Summary Reports.     I have reviewed the Nursing Notes. I have reviewed the NPO Status.   I have reviewed the Medications.     Review of Systems  Anesthesia Hx:  No problems with previous Anesthesia   History of prior surgery of interest to airway management or planning:  Previous anesthesia: General Elbow Sx:; Dialysis fistula with general anesthesia.        Denies Family Hx of Anesthesia complications.    Denies Personal Hx of Anesthesia complications.                    Social:  Non-Smoker, No Alcohol Use       Hematology/Oncology:  Hematology Normal   Oncology Normal                                   EENT/Dental:  EENT/Dental Normal           Cardiovascular:  Exercise tolerance: poor   Hypertension Valvular problems/Murmurs, MR, AI              Echo 5/25/2023: EF=35%. Mod PHTN=55. Mod-severe TR. Mod MR/PI. Mild PI. Severe LAE, Mod KRISTEN, Mild RVE. Asc Ao=4,1.    C 6/8/2023: LVEDP: N; Mod-severe PHTN=65/24. Wedge=10. No AS. Non-obst CAD.  Left main:  Large caliber left main bifurcates into LAD and circumflex.  No significant disease noted.  LAD:  Moderate large caliber LAD wraps the apex in type 1 fashion.  Large diagonal branches noted.  No significant disease noted.  Circumflex:  Small caliber vessel with no significant disease.  Tortuosity noted.  OM1 is noted with significant tortuosity distally.  true circumflex is trivial in caliber.  RCA:  Large caliber superdominant vessel which arborizes out.  Luminal irregularities noted.  Ramus intermedius:  Small caliber vessel covering the lateral wall.  No disease noted.  PET 12/29/2021: sEF=39%,rEF=38%.    BICA1-39%. B dist common carotids<50%.         Cardiomyopathy, Non-Ischemic Cardiomyopathy                        Pulmonary:  Pulmonary Normal        Mod-severe PHTN=65/24.                Renal/:  Chronic Renal Disease, ESRD, Dialysis  BPH Dialysis x 25 yrs: MWF. Last yesterday.             Hepatic/GI:     GERD, well controlled              Musculoskeletal:     walker       Spine Disorders: lumbar Chronic Pain           Neurological:  Neurology Normal                                      Endocrine:  Endocrine Normal    S/p parathyroidectomy        Dermatological:  Skin Normal    Psych:  Psychiatric Normal                    Physical Exam  General: Alert, Oriented and Cooperative  Frail, pallor, feels fatigued. walker  Airway:  Mallampati: III / III  Mouth Opening: Normal  TM Distance: > 6 cm  Tongue: Large  Neck ROM: Normal ROM    Dental:  Edentulous    Chest/Lungs:  Normal Respiratory Rate    Heart:  Rate: Normal  Rhythm: Regular Rhythm    Abdomen:  Normal, Soft        Anesthesia Plan  Type of Anesthesia, risks & benefits discussed:    Anesthesia Type: Gen Natural Airway  Intra-op Monitoring Plan: Standard ASA Monitors  Induction:  IV  Informed Consent: Informed consent signed with the Patient and all parties understand the risks and agree with anesthesia plan.  All questions answered.   ASA Score: 4  Day of Surgery Review of History & Physical: H&P Update referred to the surgeon/provider.I have interviewed and examined the patient. I have reviewed the patient's H&P dated:   Anesthesia Plan Notes: TIVA with mask/NC, GA back-up.     Ready For Surgery From Anesthesia Perspective.     .

## 2024-04-16 NOTE — PLAN OF CARE
Educated patient to Bravo study, highlighted Famotidine and Protonix on med sheet and wrote  to stop taking until study complete. Provided water and instructed him to use the device.

## 2024-04-16 NOTE — TRANSFER OF CARE
"Anesthesia Transfer of Care Note    Patient: Paul Boyce    Procedure(s) Performed: Procedure(s) (LRB):  BRAVO 96 HR (N/A)  EGD, WITH CLOSED BIOPSY    Patient location: GI    Anesthesia Type: general    Transport from OR: Transported from OR on room air with adequate spontaneous ventilation    Post pain: adequate analgesia    Post assessment: no apparent anesthetic complications    Post vital signs: stable    Level of consciousness: awake    Nausea/Vomiting: no nausea/vomiting    Complications: none    Transfer of care protocol was followed      Last vitals: Visit Vitals  /82   Pulse 63   Temp 36.2 °C (97.2 °F)   Resp 18   Ht 5' 5" (1.651 m)   Wt 54.4 kg (120 lb)   SpO2 100%   BMI 19.97 kg/m²     "

## 2024-04-16 NOTE — ANESTHESIA POSTPROCEDURE EVALUATION
Anesthesia Post Evaluation    Patient: Paul Boyce    Procedure(s) Performed: Procedure(s) (LRB):  BRAVO 96 HR (N/A)  EGD, WITH CLOSED BIOPSY    Final Anesthesia Type: general (-TIVA Margo AW)      Patient location during evaluation: GI PACU  Patient participation: Yes- Able to Participate  Level of consciousness: awake and alert, awake and oriented  Post-procedure vital signs: reviewed and stable  Pain management: adequate  Airway patency: patent    PONV status at discharge: No PONV  Anesthetic complications: no      Cardiovascular status: blood pressure returned to baseline, hemodynamically stable and stable  Respiratory status: unassisted, spontaneous ventilation and room air  Hydration status: euvolemic  Follow-up not needed.              Vitals Value Taken Time   /61 04/16/24 0940   Temp 35.9 °C (96.6 °F) 04/16/24 0908   Pulse 63 04/16/24 0940   Resp 16 04/16/24 0940   SpO2 100 % 04/16/24 0940         No case tracking events are documented in the log.      Pain/Rebecca Score: Rebecca Score: 10 (4/16/2024  9:41 AM)

## 2024-04-16 NOTE — DISCHARGE SUMMARY
[facility]  Discharge Note  Short Stay    Procedure(s) (LRB):  BRAVO 96 HR (N/A)  EGD, WITH CLOSED BIOPSY      OUTCOME: Patient tolerated treatment/procedure well without complication and is now ready for discharge.    DISPOSITION: Home or Self Care    FINAL DIAGNOSIS:  gastritis and fundic gland polyps    FOLLOWUP:  pending bravo results    DISCHARGE INSTRUCTIONS:  No discharge procedures on file.     TIME SPENT ON DISCHARGE: 15 minutes

## 2024-04-16 NOTE — PLAN OF CARE
Patient plans to bring device back on Monday after his dialysis. I advised him that we have to have it back ASAP in order to process the results from the monitor.

## 2024-04-16 NOTE — H&P
Endoscopy History and Physical    PCP - Lala Adkins MD    Procedure - EGD with Bravo pH monitor  Sedation: MAC  ASA - per anesthesia  Mallampati - per anesthesia  History of Anesthesia problems - no  Family history Anesthesia problems -  no     HPI:  This is a 54 y.o. male here for evaluation of :     Reflux - yes  Dysphagia - no  Abdominal pain - no  Diarrhea - no    ROS:  Constitutional: No fevers, chills, No weight loss  ENT: No allergies  CV: No chest pain  Pulm: No cough, No shortness of breath  Ophtho: No vision changes  GI: see HPI  Medical History:  has a past medical history of CVD (cardiovascular disease), ESRD (end stage renal disease), Hypertension, and Venous insufficiency.    Surgical History:  has a past surgical history that includes Knee surgery; Elbow surgery; Dialysis fistula creation; and Catheterization of both left and right heart (N/A, 6/8/2023).    Family History: family history is not on file.. Otherwise no colon cancer, inflammatory bowel disease, or GI malignancies.    Social History:  reports that he has never smoked. He has never used smokeless tobacco. He reports that he does not drink alcohol and does not use drugs.    Review of patient's allergies indicates:   Allergen Reactions    Doxercalciferol Itching       Medications:   Medications Prior to Admission   Medication Sig Dispense Refill Last Dose    amLODIPine (NORVASC) 10 MG tablet Take 10 mg by mouth once daily.   Taking    B complex-vitamin C-folic acid (RENAL VITAMIN) 0.8 mg Tab Take 1 tablet by mouth once daily.   Taking    carvediloL (COREG) 25 MG tablet Take 25 mg by mouth 2 (two) times daily with meals.   Taking    dextrose 5 % in water (D5W) PgBk 50 mL with calcium gluconate 100 mg/mL (10%) Soln 1 g Inject 1,000 mLs into the vein.   Taking    famotidine (PEPCID) 40 MG tablet Take 20 mg by mouth 3 (three) times daily.   Taking    hydrALAZINE (APRESOLINE) 25 MG tablet Take 2 tablets (50 mg total) by mouth 3  (three) times daily. 90 tablet 5 Taking    isosorbide dinitrate (ISORDIL) 20 MG tablet Take 1 tablet (20 mg total) by mouth 3 (three) times daily. 90 tablet 11 Taking    pantoprazole (PROTONIX) 20 MG tablet Take 40 mg by mouth 2 (two) times a day.   Taking    aspirin (ECOTRIN) 81 MG EC tablet Take 81 mg by mouth once daily. (Patient not taking: Reported on 4/16/2024)   Not Taking    B complex-vitamin C-folic acid (BERNARDA-SHERRELL) 0.8 mg Tab Take 1 tablet by mouth Daily. (Patient not taking: Reported on 4/16/2024)   Not Taking    sevelamer carbonate (RENVELA) 800 mg Tab Take 800 mg by mouth 4 (four) times daily with meals and nightly. (Patient not taking: Reported on 4/16/2024)   Not Taking       Objective Findings:    Vital Signs: Per nursing notes.    Physical Exam:  General Appearance: Well appearing in no acute distress  Head:   Normocephalic, without obvious abnormality  Eyes:    No scleral icterus  Airway: Open  Neck: No restriction in mobility  Lungs: CTA bilaterally in anterior and posterior fields, no wheezes, no crackles.  Heart:  Regular rate and rhythm, S1, S2 normal, no murmurs heard  Abdomen: Soft, non tender, non distended      Labs:  Lab Results   Component Value Date    ALT 11 11/02/2023    AST 22 11/02/2023     (L) 11/02/2023    K 3.8 11/02/2023     11/02/2023    CREATININE 4.4 (H) 11/02/2023    BUN 15 11/02/2023    CO2 21 (L) 11/02/2023    TSH 4.228 (H) 11/02/2023         I have explained the risks and benefits of endoscopy procedures to the patient including but not limited to bleeding, perforation, infection, and death.    Thank you so much for allowing me to participate in the care of Paul Valenzuela MD

## 2024-04-16 NOTE — [employer] PATIENT INSTRUCTIONS
Discharge Summary/Instructions after an Endoscopic Procedure  Patient Name: Paul Boyce  Patient MRN: [id number]  Patient YOB: 1970 Tuesday, April 16, 2024  Shahzad Valenzuela MD  Dear patient,  As a result of recent federal legislation (The Federal Cures Act), you may   receive lab or pathology results from your procedure in your [facility]   account before your physician is able to contact you. Your physician or   their representative will relay the results to you with their   recommendations at their soonest availability.  Thank you,  RESTRICTIONS:  During your procedure today, you received medications for sedation.  These   medications may affect your judgment, balance and coordination.  Therefore,   for 24 hours, you have the following restrictions:   - DO NOT drive a car, operate machinery, make legal/financial decisions,   sign important papers or drink alcohol.    ACTIVITY:  Today: no heavy lifting, straining or running due to procedural   sedation/anesthesia.  The following day: return to full activity including work.  DIET:  Eat and drink normally unless instructed otherwise.     TREATMENT FOR COMMON SIDE EFFECTS:  - Mild abdominal pain, nausea, belching, bloating or excessive gas:  rest,   eat lightly and use a heating pad.  - Sore Throat: treat with throat lozenges and/or gargle with warm salt   water.  - Because air was used during the procedure, expelling large amounts of air   from your rectum or belching is normal.  - If a bowel prep was taken, you may not have a bowel movement for 1-3 days.    This is normal.  SYMPTOMS TO WATCH FOR AND REPORT TO YOUR PHYSICIAN:  1. Abdominal pain or bloating, other than gas cramps.  2. Chest pain.  3. Back pain.  4. Signs of infection such as: chills or fever occurring within 24 hours   after the procedure.  5. Rectal bleeding, which would show as bright red, maroon, or black stools.   (A tablespoon of blood from the rectum is not serious, especially if    hemorrhoids are present.)  6. Vomiting.  7. Weakness or dizziness.  GO DIRECTLY TO THE NEAREST EMERGENCY ROOM IF YOU HAVE ANY OF THE FOLLOWING:      Difficulty breathing              Chills and/or fever over 101 F   Persistent vomiting and/or vomiting blood   Severe abdominal pain   Severe chest pain   Black, tarry stools   Bleeding- more than one tablespoon   Any other symptom or condition that you feel may need urgent attention  Your doctor recommends these additional instructions:  If any biopsies were taken, your doctors clinic will contact you in 1 to 2   weeks with any results.  - Discharge patient to home (with escort).   - Await pathology results.   - Continue present medications.   - The findings and recommendations were discussed with the patient.  For questions, problems or results please call your physician - Shahzad Valenzuela MD at Work:  [phone].  [facility], EMERGENCY ROOM PHONE NUMBER: [phone]  IF A COMPLICATION OR EMERGENCY SITUATION ARISES AND YOU ARE UNABLE TO REACH   YOUR PHYSICIAN - GO DIRECTLY TO THE EMERGENCY ROOM.  MD Shahzad Sheehan MD  4/16/2024 9:13:15 AM  This report has been verified and signed electronically.  Dear patient,  As a result of recent federal legislation (The Federal Cures Act), you may   receive lab or pathology results from your procedure in your [facility]   account before your physician is able to contact you. Your physician or   their representative will relay the results to you with their   recommendations at their soonest availability.  Thank you,  [employer]

## 2024-04-17 LAB — PSYCHE PATHOLOGY RESULT: NORMAL

## 2024-04-18 VITALS
SYSTOLIC BLOOD PRESSURE: 161 MMHG | BODY MASS INDEX: 19.99 KG/M2 | RESPIRATION RATE: 16 BRPM | OXYGEN SATURATION: 100 % | TEMPERATURE: 97 F | DIASTOLIC BLOOD PRESSURE: 61 MMHG | HEART RATE: 63 BPM | WEIGHT: 120 LBS | HEIGHT: 65 IN

## 2024-05-10 LAB
ANION GAP SERPL CALC-SCNC: 18 MMOL/L (ref 8–16)
BUN SERPL-MCNC: 14 MG/DL (ref 6–30)
CHLORIDE SERPL-SCNC: 94 MMOL/L (ref 95–110)
CREAT SERPL-MCNC: 5.2 MG/DL (ref 0.5–1.4)
GLUCOSE SERPL-MCNC: 85 MG/DL (ref 70–110)
HCT VFR BLD CALC: 32 %PCV (ref 36–54)
HGB BLD-MCNC: 11 G/DL
POC IONIZED CALCIUM: 0.81 MMOL/L (ref 1.06–1.42)
POC TCO2 (MEASURED): 29 MMOL/L (ref 23–29)
POTASSIUM BLD-SCNC: 3.8 MMOL/L (ref 3.5–5.1)
SAMPLE: ABNORMAL
SODIUM BLD-SCNC: 137 MMOL/L (ref 136–145)

## (undated) DEVICE — Device

## (undated) DEVICE — FORCEP BX LG CAP 2.8MMX240CM

## (undated) DEVICE — SHEATH INTRODUCER 5FR 10CM

## (undated) DEVICE — CONTAINER SPECIMEN SCREW 4OZ

## (undated) DEVICE — BAG LABGUARD BIOHAZARD 6X9IN

## (undated) DEVICE — TUBING O2 FEMALE CONN 13FT

## (undated) DEVICE — CATH IMPULSE MP 5FR 145CM

## (undated) DEVICE — GUIDEWIRE GLADIUS STR 300CM

## (undated) DEVICE — PAD DEFIB CADENCE ADULT R2

## (undated) DEVICE — KIT CANIST SUCTION 1200CC

## (undated) DEVICE — SHEATH INTRODUCER 7FR 11CM

## (undated) DEVICE — KIT MINI STK MAX COAX 5FR 10CM

## (undated) DEVICE — FORCEP ALLIGATOR 2.8MM W/NDL

## (undated) DEVICE — SOL IRRI STRL WATER 1000ML

## (undated) DEVICE — STOPCOCK 3-WAY

## (undated) DEVICE — GUIDEWIRE SAFE-T-J CRV 180CM

## (undated) DEVICE — CATH SWAN GANZ STND 7FR

## (undated) DEVICE — TIP SUCTION YANKAUER

## (undated) DEVICE — GUIDEWIRE INQWIRE SE 3MM JTIP

## (undated) DEVICE — SET ANGIO ACIST CVI ANGIOTOUCH

## (undated) DEVICE — COLLECTION SPECIMEN NEPTUNE

## (undated) DEVICE — KIT SURGICAL COLON .25 1.1OZ

## (undated) DEVICE — CANNULA ADULT NASAL 7FT